# Patient Record
Sex: FEMALE | URBAN - METROPOLITAN AREA
[De-identification: names, ages, dates, MRNs, and addresses within clinical notes are randomized per-mention and may not be internally consistent; named-entity substitution may affect disease eponyms.]

---

## 2023-04-26 ENCOUNTER — OFFICE (OUTPATIENT)
Dept: URBAN - METROPOLITAN AREA CLINIC 76 | Facility: CLINIC | Age: 53
Setting detail: OPHTHALMOLOGY
End: 2023-04-26
Payer: COMMERCIAL

## 2023-04-26 DIAGNOSIS — H01.009: ICD-10-CM

## 2023-04-26 DIAGNOSIS — H16.223: ICD-10-CM

## 2023-04-26 DIAGNOSIS — H25.042: ICD-10-CM

## 2023-04-26 PROCEDURE — 92015 DETERMINE REFRACTIVE STATE: CPT | Performed by: OPHTHALMOLOGY

## 2023-04-26 PROCEDURE — 92004 COMPRE OPH EXAM NEW PT 1/>: CPT | Performed by: OPHTHALMOLOGY

## 2023-04-26 ASSESSMENT — TONOMETRY
OD_IOP_MMHG: 16
OS_IOP_MMHG: 14

## 2023-04-26 ASSESSMENT — KERATOMETRY
OD_K1POWER_DIOPTERS: 43.50
OD_AXISANGLE_DEGREES: 101
OS_K2POWER_DIOPTERS: 45.25
OS_K1POWER_DIOPTERS: 44.50
OS_AXISANGLE_DEGREES: 084
OD_K2POWER_DIOPTERS: 44.00

## 2023-04-26 ASSESSMENT — REFRACTION_AUTOREFRACTION
OD_CYLINDER: -0.50
OD_AXIS: 060
OS_SPHERE: -1.25
OS_CYLINDER: SPH
OD_SPHERE: +0.25

## 2023-04-26 ASSESSMENT — REFRACTION_MANIFEST
OD_ADD: +1.75
OD_VA2: 20/20(J1+)
OS_CYLINDER: SPH
OD_SPHERE: PLANO
OS_ADD: +1.75
OS_SPHERE: -0.75
OD_VA1: 20/20
OS_VA1: 20/25+2
OS_VA2: 20/20(J1+)

## 2023-04-26 ASSESSMENT — VISUAL ACUITY
OD_BCVA: 20/60-2
OS_BCVA: 20/20

## 2023-04-26 ASSESSMENT — CONFRONTATIONAL VISUAL FIELD TEST (CVF)
OS_FINDINGS: FULL
OD_FINDINGS: FULL

## 2023-04-26 ASSESSMENT — LID EXAM ASSESSMENTS
OD_BLEPHARITIS: T
OS_BLEPHARITIS: T

## 2023-04-26 ASSESSMENT — SPHEQUIV_DERIVED: OD_SPHEQUIV: 0

## 2023-04-26 ASSESSMENT — TEAR BREAK UP TIME (TBUT)
OS_TBUT: 1+
OD_TBUT: 1+

## 2023-04-26 ASSESSMENT — AXIALLENGTH_DERIVED: OD_AL: 23.5004

## 2023-04-29 ENCOUNTER — OFFICE (OUTPATIENT)
Dept: URBAN - METROPOLITAN AREA CLINIC 76 | Facility: CLINIC | Age: 53
Setting detail: OPHTHALMOLOGY
End: 2023-04-29
Payer: COMMERCIAL

## 2023-04-29 DIAGNOSIS — H16.223: ICD-10-CM

## 2023-04-29 DIAGNOSIS — H10.012: ICD-10-CM

## 2023-04-29 DIAGNOSIS — H25.042: ICD-10-CM

## 2023-04-29 DIAGNOSIS — H01.009: ICD-10-CM

## 2023-04-29 PROCEDURE — 99214 OFFICE O/P EST MOD 30 MIN: CPT | Performed by: OPHTHALMOLOGY

## 2023-04-29 ASSESSMENT — TONOMETRY
OS_IOP_MMHG: 18
OD_IOP_MMHG: 13

## 2023-04-29 ASSESSMENT — REFRACTION_AUTOREFRACTION
OD_AXIS: 060
OS_SPHERE: -1.25
OD_SPHERE: +0.25
OS_CYLINDER: SPH
OD_CYLINDER: -0.50

## 2023-04-29 ASSESSMENT — REFRACTION_MANIFEST
OS_CYLINDER: SPH
OD_VA1: 20/20
OS_VA2: 20/20(J1+)
OS_ADD: +1.75
OS_VA1: 20/25+2
OD_SPHERE: PLANO
OD_VA2: 20/20(J1+)
OS_SPHERE: -0.75
OD_ADD: +1.75

## 2023-04-29 ASSESSMENT — TEAR BREAK UP TIME (TBUT)
OD_TBUT: 1+
OS_TBUT: 1+

## 2023-04-29 ASSESSMENT — VISUAL ACUITY
OD_BCVA: 20/25-2
OS_BCVA: 20/20

## 2023-04-29 ASSESSMENT — AXIALLENGTH_DERIVED: OD_AL: 23.5004

## 2023-04-29 ASSESSMENT — KERATOMETRY
OS_K2POWER_DIOPTERS: 45.25
OS_AXISANGLE_DEGREES: 084
OD_AXISANGLE_DEGREES: 101
OS_K1POWER_DIOPTERS: 44.50
OD_K1POWER_DIOPTERS: 43.50
OD_K2POWER_DIOPTERS: 44.00

## 2023-04-29 ASSESSMENT — CONFRONTATIONAL VISUAL FIELD TEST (CVF)
OS_FINDINGS: FULL
OD_FINDINGS: FULL

## 2023-04-29 ASSESSMENT — SPHEQUIV_DERIVED: OD_SPHEQUIV: 0

## 2023-06-22 PROBLEM — Z00.00 ENCOUNTER FOR PREVENTIVE HEALTH EXAMINATION: Status: ACTIVE | Noted: 2023-06-22

## 2023-06-27 ENCOUNTER — APPOINTMENT (OUTPATIENT)
Dept: ORTHOPEDIC SURGERY | Facility: CLINIC | Age: 53
End: 2023-06-27
Payer: COMMERCIAL

## 2023-06-27 DIAGNOSIS — M89.9 DISORDER OF BONE, UNSPECIFIED: ICD-10-CM

## 2023-06-27 PROCEDURE — 99204 OFFICE O/P NEW MOD 45 MIN: CPT

## 2023-06-27 NOTE — DISCUSSION/SUMMARY
[All Questions Answered] : Patient (and family) had all questions answered to an agreeable level of satisfaction [Interested in Proceeding] : Patient (and family) expressed understanding and interest in proceeding with the plan as outlined [de-identified] : Patient has a lesion that both seems to be involving the surface of the bone as well as possibly the soft tissues.  It does not look like it is going into the bone.  Regardless I think she would benefit both from a CT scan of the leg as well as an MRI scan.  I have ordered both of these for her.  I will see her back again when they are done and we can plan for further surgery.  If this is a been there for 25 years likely it is benign however there is always a chance of some degeneration of what ever lesion it is.  Follow-up after imaging.\par \par If imaging was ordered, the patient was told to make an appointment to review findings right after all imaging is completed.\par \par We discussed risks, benefits and alternatives. Rationale of care was reviewed and all questions were answered. Patient (and family) had all questions answered to her degree of the level of satisfaction. Patient (and family) expressed understanding and interest in proceeding with the plan as outlined.\par \par \par \par \par This note was done with a voice recognition transcription software and any typos are related to this rather than medical error. Surgical risks reviewed. Patient (and family) had all questions answered to an agreeable level of satisfaction. Patient (and family) expressed understanding and interest in proceeding with the plan as outlined.  \par

## 2023-06-27 NOTE — HISTORY OF PRESENT ILLNESS
[FreeTextEntry1] : This is a 52-year-old female who has a history of having a surgery on her left leg 25 years ago.  They were told it was a benign lesion and only half of it was taken out.  Since then she has been having significant pain in the area.  Apparently this surgery was done at [facility] but she has no information on it.  She has been having increasing pain over the past 6 months to a year.  It bothers her when she pushes on it and even at rest.  She wanted to have it checked out.  She was sent for only an x-ray and no one ordered any other studies.  She was then sent to me. [Worsening] : worsening [___ yrs] : [unfilled] year(s) ago [3] : currently ~his/her~ pain is 3 out of 10

## 2023-06-27 NOTE — DATA REVIEWED
[Imaging Present] : Present [de-identified] : X-rays reviewed from 10/12/2023 of the left tib-fib show a lesion coming off of the lateral side of the fibula proximally.  There is some peripheral bone with a questionable lesion in this area.  It is very ill-defined.  There is no obvious internal bone destruction.

## 2023-06-27 NOTE — PHYSICAL EXAM
[FreeTextEntry1] : On exam the patient stands in good balance.  She has full range of motion of her left hip knee ankle and foot.  She has full peroneal nerve function as well as elevation of her foot.  She has normal sensation in the area.  She has good great toe sensation and motion.  She has tenderness over the proximal calf posterolaterally.  She has a 8 cm incision well-healed.  She has no palpable masses.  She has tenderness throughout this area laterally and posterolaterally.  She stable to varus valgus and anterior testing.  She is otherwise neurovascular intact. [General Appearance - Well-Appearing] : Well appearing [General Appearance - Well Nourished] : well nourished [Oriented To Time, Place, And Person] : Oriented to person, place, and time [Impaired Insight] : Insight and judgment were intact [Affect] : The affect was normal. [Mood] : the mood was normal [Sclera] : the sclera and conjunctiva were normal [Neck Cervical Mass (___cm)] : no neck mass was observed [Heart Rate And Rhythm] : heart rate was normal and rhythm regular [] : No respiratory distress [Abdomen Soft] : Soft [Normal Station and Gait] : gait and station were normal [Tenderness] : tenderness [Skin Changes - Describe changes:] : No skin changes noted [Incision Healed - Describe:] : Incision is healed ~M [Full ROM Unless otherwise noted:] : Full range of motion unless otherwise noted: [LE  Motor Strength Normal unless otherwise noted:] : 5/5 strength in bilateral lower extemities unless otherwise noted. [Normal] : Sensation intact to light touch.

## 2025-04-25 ENCOUNTER — APPOINTMENT (OUTPATIENT)
Dept: ORTHOPEDIC SURGERY | Facility: CLINIC | Age: 55
End: 2025-04-25